# Patient Record
Sex: FEMALE | Race: BLACK OR AFRICAN AMERICAN | NOT HISPANIC OR LATINO | Employment: FULL TIME | URBAN - METROPOLITAN AREA
[De-identification: names, ages, dates, MRNs, and addresses within clinical notes are randomized per-mention and may not be internally consistent; named-entity substitution may affect disease eponyms.]

---

## 2017-11-16 ENCOUNTER — HISTORICAL (OUTPATIENT)
Dept: LAB | Facility: HOSPITAL | Age: 22
End: 2017-11-16

## 2017-11-16 LAB
ABS NEUT (OLG): 1.77 X10(3)/MCL (ref 2.1–9.2)
ALBUMIN SERPL-MCNC: 4.9 GM/DL (ref 3.4–5)
ALBUMIN/GLOB SERPL: 1.2 {RATIO}
ALP SERPL-CCNC: 84 UNIT/L (ref 38–126)
ALT SERPL-CCNC: 22 UNIT/L (ref 12–78)
AST SERPL-CCNC: 15 UNIT/L (ref 15–37)
BASOPHILS # BLD AUTO: 0 X10(3)/MCL (ref 0–0.2)
BASOPHILS NFR BLD AUTO: 1 %
BILIRUB SERPL-MCNC: 0.9 MG/DL (ref 0.2–1)
BILIRUBIN DIRECT+TOT PNL SERPL-MCNC: 0.2 MG/DL (ref 0–0.2)
BILIRUBIN DIRECT+TOT PNL SERPL-MCNC: 0.7 MG/DL (ref 0–0.8)
BUN SERPL-MCNC: 11 MG/DL (ref 7–18)
CALCIUM SERPL-MCNC: 9.9 MG/DL (ref 8.5–10.1)
CHLORIDE SERPL-SCNC: 103 MMOL/L (ref 98–107)
CHOLEST SERPL-MCNC: 181 MG/DL (ref 0–200)
CHOLEST/HDLC SERPL: 1.6 {RATIO} (ref 0–4)
CO2 SERPL-SCNC: 28 MMOL/L (ref 21–32)
CREAT SERPL-MCNC: 0.76 MG/DL (ref 0.55–1.02)
DEPRECATED CALCIDIOL+CALCIFEROL SERPL-MC: 10.31 NG/ML (ref 30–80)
EOSINOPHIL # BLD AUTO: 0.1 X10(3)/MCL (ref 0–0.9)
EOSINOPHIL NFR BLD AUTO: 2 %
ERYTHROCYTE [DISTWIDTH] IN BLOOD BY AUTOMATED COUNT: 12.9 % (ref 11.5–17)
GLOBULIN SER-MCNC: 4 GM/DL (ref 2.4–3.5)
GLUCOSE SERPL-MCNC: 73 MG/DL (ref 74–106)
HAV IGM SERPL QL IA: NEGATIVE
HBV CORE IGM SERPL QL IA: NEGATIVE
HBV SURFACE AG SERPL QL IA: NEGATIVE
HCT VFR BLD AUTO: 39.7 % (ref 37–47)
HCV AB SERPL QL IA: NEGATIVE
HDLC SERPL-MCNC: 111 MG/DL (ref 35–60)
HEPATITIS PANEL INTERP: NORMAL
HGB BLD-MCNC: 13.5 GM/DL (ref 12–16)
HIV 1+2 AB+HIV1 P24 AG SERPL QL IA: NEGATIVE
LDLC SERPL CALC-MCNC: 60 MG/DL (ref 0–129)
LYMPHOCYTES # BLD AUTO: 1.9 X10(3)/MCL (ref 0.6–4.6)
LYMPHOCYTES NFR BLD AUTO: 48 %
MCH RBC QN AUTO: 28.8 PG (ref 27–31)
MCHC RBC AUTO-ENTMCNC: 34 GM/DL (ref 33–36)
MCV RBC AUTO: 84.8 FL (ref 80–94)
MONOCYTES # BLD AUTO: 0.2 X10(3)/MCL (ref 0.1–1.3)
MONOCYTES NFR BLD AUTO: 4 %
NEUTROPHILS # BLD AUTO: 1.77 X10(3)/MCL (ref 2.1–9.2)
NEUTROPHILS NFR BLD AUTO: 44 %
PLATELET # BLD AUTO: 227 X10(3)/MCL (ref 130–400)
PLATELET # BLD EST: NORMAL 10*3/UL
PMV BLD AUTO: 11.4 FL (ref 9.4–12.4)
POTASSIUM SERPL-SCNC: 4.1 MMOL/L (ref 3.5–5.1)
PROT SERPL-MCNC: 8.9 GM/DL (ref 6.4–8.2)
RBC # BLD AUTO: 4.68 X10(6)/MCL (ref 4.2–5.4)
RPR SER QL: NORMAL
SODIUM SERPL-SCNC: 139 MMOL/L (ref 136–145)
TRIGL SERPL-MCNC: 49 MG/DL (ref 30–150)
TSH SERPL-ACNC: 0.33 MIU/ML (ref 0.36–3.74)
VLDLC SERPL CALC-MCNC: 10 MG/DL
WBC # SPEC AUTO: 4 X10(3)/MCL (ref 4.5–11.5)

## 2018-04-23 ENCOUNTER — HISTORICAL (OUTPATIENT)
Dept: URGENT CARE | Facility: CLINIC | Age: 23
End: 2018-04-23

## 2018-04-23 LAB
BILIRUB SERPL-MCNC: NEGATIVE MG/DL
BLOOD URINE, POC: NEGATIVE
CLARITY, POC UA: NORMAL
COLOR, POC UA: YELLOW
GLUCOSE UR QL STRIP: NEGATIVE
KETONES UR QL STRIP: NEGATIVE
LEUKOCYTE EST, POC UA: NORMAL
NITRITE, POC UA: POSITIVE
PH, POC UA: 6
PROTEIN, POC: NEGATIVE
SPECIFIC GRAVITY, POC UA: 1.01
UROBILINOGEN, POC UA: NORMAL

## 2018-11-16 ENCOUNTER — HISTORICAL (OUTPATIENT)
Dept: LAB | Facility: HOSPITAL | Age: 23
End: 2018-11-16

## 2018-11-16 LAB
ABS NEUT (OLG): 3.54 X10(3)/MCL (ref 2.1–9.2)
ALBUMIN SERPL-MCNC: 4.3 GM/DL (ref 3.4–5)
ALBUMIN/GLOB SERPL: 1.3 {RATIO}
ALP SERPL-CCNC: 82 UNIT/L (ref 38–126)
ALT SERPL-CCNC: 46 UNIT/L (ref 12–78)
AST SERPL-CCNC: 28 UNIT/L (ref 15–37)
BASOPHILS # BLD AUTO: 0 X10(3)/MCL (ref 0–0.2)
BASOPHILS NFR BLD AUTO: 1 %
BILIRUB SERPL-MCNC: 0.9 MG/DL (ref 0.2–1)
BILIRUBIN DIRECT+TOT PNL SERPL-MCNC: 0.2 MG/DL (ref 0–0.2)
BILIRUBIN DIRECT+TOT PNL SERPL-MCNC: 0.7 MG/DL (ref 0–0.8)
BUN SERPL-MCNC: 10 MG/DL (ref 7–18)
CALCIUM SERPL-MCNC: 9.2 MG/DL (ref 8.5–10.1)
CHLORIDE SERPL-SCNC: 107 MMOL/L (ref 98–107)
CHOLEST SERPL-MCNC: 154 MG/DL (ref 0–200)
CHOLEST/HDLC SERPL: 2.3 {RATIO} (ref 0–4)
CO2 SERPL-SCNC: 28 MMOL/L (ref 21–32)
CREAT SERPL-MCNC: 0.85 MG/DL (ref 0.55–1.02)
DEPRECATED CALCIDIOL+CALCIFEROL SERPL-MC: 15.02 NG/ML (ref 30–80)
EOSINOPHIL # BLD AUTO: 0.1 X10(3)/MCL (ref 0–0.9)
EOSINOPHIL NFR BLD AUTO: 2 %
ERYTHROCYTE [DISTWIDTH] IN BLOOD BY AUTOMATED COUNT: 12.5 % (ref 11.5–17)
GLOBULIN SER-MCNC: 3.2 GM/DL (ref 2.4–3.5)
GLUCOSE SERPL-MCNC: 81 MG/DL (ref 74–106)
HCT VFR BLD AUTO: 37.7 % (ref 37–47)
HDLC SERPL-MCNC: 68 MG/DL (ref 35–60)
HGB BLD-MCNC: 12.1 GM/DL (ref 12–16)
LDLC SERPL CALC-MCNC: 78 MG/DL (ref 0–129)
LYMPHOCYTES # BLD AUTO: 1.8 X10(3)/MCL (ref 0.6–4.6)
LYMPHOCYTES NFR BLD AUTO: 30 %
MCH RBC QN AUTO: 28.1 PG (ref 27–31)
MCHC RBC AUTO-ENTMCNC: 32.1 GM/DL (ref 33–36)
MCV RBC AUTO: 87.5 FL (ref 80–94)
MONOCYTES # BLD AUTO: 0.3 X10(3)/MCL (ref 0.1–1.3)
MONOCYTES NFR BLD AUTO: 5 %
NEUTROPHILS # BLD AUTO: 3.54 X10(3)/MCL (ref 2.1–9.2)
NEUTROPHILS NFR BLD AUTO: 61 %
PLATELET # BLD AUTO: 277 X10(3)/MCL (ref 130–400)
PMV BLD AUTO: 10.1 FL (ref 9.4–12.4)
POTASSIUM SERPL-SCNC: 3.6 MMOL/L (ref 3.5–5.1)
PROT SERPL-MCNC: 7.5 GM/DL (ref 6.4–8.2)
RBC # BLD AUTO: 4.31 X10(6)/MCL (ref 4.2–5.4)
SODIUM SERPL-SCNC: 143 MMOL/L (ref 136–145)
T3FREE SERPL-MCNC: 2.81 PG/ML (ref 2.18–3.98)
T4 FREE SERPL-MCNC: 0.94 NG/DL (ref 0.76–1.46)
TRIGL SERPL-MCNC: 39 MG/DL (ref 30–150)
TSH SERPL-ACNC: 0.2 MIU/L (ref 0.36–3.74)
VLDLC SERPL CALC-MCNC: 8 MG/DL
WBC # SPEC AUTO: 5.8 X10(3)/MCL (ref 4.5–11.5)

## 2019-04-02 LAB
BILIRUB SERPL-MCNC: NEGATIVE MG/DL
BLOOD URINE, POC: NEGATIVE
CLARITY, POC UA: CLEAR
COLOR, POC UA: YELLOW
GLUCOSE UR QL STRIP: NEGATIVE
KETONES UR QL STRIP: NEGATIVE
LEUKOCYTE EST, POC UA: NORMAL
NITRITE, POC UA: NEGATIVE
PH, POC UA: 7
PROTEIN, POC: NEGATIVE
SPECIFIC GRAVITY, POC UA: 1.02
UROBILINOGEN, POC UA: NORMAL

## 2019-04-16 ENCOUNTER — HISTORICAL (OUTPATIENT)
Dept: ADMINISTRATIVE | Facility: HOSPITAL | Age: 24
End: 2019-04-16

## 2019-06-21 PROBLEM — N62 MACROMASTIA: Status: ACTIVE | Noted: 2019-06-21

## 2019-07-12 PROBLEM — Z98.890 S/P BILATERAL BREAST REDUCTION: Status: ACTIVE | Noted: 2019-07-12

## 2019-09-03 ENCOUNTER — HISTORICAL (OUTPATIENT)
Dept: ADMINISTRATIVE | Facility: HOSPITAL | Age: 24
End: 2019-09-03

## 2019-09-03 LAB
BILIRUB SERPL-MCNC: NEGATIVE MG/DL
BLOOD URINE, POC: NEGATIVE
CLARITY, POC UA: CLEAR
COLOR, POC UA: YELLOW
GLUCOSE UR QL STRIP: NEGATIVE
HAV IGM SERPL QL IA: NONREACTIVE
HBV CORE IGM SERPL QL IA: NONREACTIVE
HBV SURFACE AG SERPL QL IA: NEGATIVE
HCV AB SERPL QL IA: NONREACTIVE
HIV 1+2 AB+HIV1 P24 AG SERPL QL IA: NONREACTIVE
KETONES UR QL STRIP: NEGATIVE
LEUKOCYTE EST, POC UA: NEGATIVE
NITRITE, POC UA: NEGATIVE
PH, POC UA: 7.5
PROTEIN, POC: NEGATIVE
SPECIFIC GRAVITY, POC UA: 1.01
T PALLIDUM AB SER QL: NONREACTIVE
UROBILINOGEN, POC UA: NORMAL

## 2020-03-12 ENCOUNTER — HISTORICAL (OUTPATIENT)
Dept: LAB | Facility: HOSPITAL | Age: 25
End: 2020-03-12

## 2020-03-12 LAB
ABS NEUT (OLG): 3.34 X10(3)/MCL (ref 2.1–9.2)
ALBUMIN SERPL-MCNC: 4.1 GM/DL (ref 3.4–5)
ALBUMIN/GLOB SERPL: 1.2 {RATIO}
ALP SERPL-CCNC: 90 UNIT/L (ref 38–126)
ALT SERPL-CCNC: 42 UNIT/L (ref 12–78)
AST SERPL-CCNC: 30 UNIT/L (ref 15–37)
BASOPHILS # BLD AUTO: 0 X10(3)/MCL (ref 0–0.2)
BASOPHILS NFR BLD AUTO: 0 %
BILIRUB SERPL-MCNC: 0.7 MG/DL (ref 0.2–1)
BILIRUBIN DIRECT+TOT PNL SERPL-MCNC: 0.2 MG/DL (ref 0–0.2)
BILIRUBIN DIRECT+TOT PNL SERPL-MCNC: 0.5 MG/DL (ref 0–0.8)
BUN SERPL-MCNC: 13 MG/DL (ref 7–18)
CALCIUM SERPL-MCNC: 9 MG/DL (ref 8.5–10.1)
CHLORIDE SERPL-SCNC: 105 MMOL/L (ref 98–107)
CHOLEST SERPL-MCNC: 155 MG/DL (ref 0–200)
CHOLEST/HDLC SERPL: 1.9 {RATIO} (ref 0–4)
CO2 SERPL-SCNC: 28 MMOL/L (ref 21–32)
CREAT SERPL-MCNC: 0.85 MG/DL (ref 0.55–1.02)
DEPRECATED CALCIDIOL+CALCIFEROL SERPL-MC: 16.56 NG/DL (ref 30–80)
EOSINOPHIL # BLD AUTO: 0.2 X10(3)/MCL (ref 0–0.9)
EOSINOPHIL NFR BLD AUTO: 3 %
ERYTHROCYTE [DISTWIDTH] IN BLOOD BY AUTOMATED COUNT: 12.5 % (ref 11.5–17)
GLOBULIN SER-MCNC: 3.4 GM/DL (ref 2.4–3.5)
GLUCOSE SERPL-MCNC: 69 MG/DL (ref 74–106)
HCT VFR BLD AUTO: 39.4 % (ref 37–47)
HDLC SERPL-MCNC: 82 MG/DL (ref 35–60)
HGB BLD-MCNC: 12.5 GM/DL (ref 12–16)
LDLC SERPL CALC-MCNC: 58 MG/DL (ref 0–129)
LYMPHOCYTES # BLD AUTO: 2.2 X10(3)/MCL (ref 0.6–4.6)
LYMPHOCYTES NFR BLD AUTO: 35 %
MCH RBC QN AUTO: 28.1 PG (ref 27–31)
MCHC RBC AUTO-ENTMCNC: 31.7 GM/DL (ref 33–36)
MCV RBC AUTO: 88.5 FL (ref 80–94)
MONOCYTES # BLD AUTO: 0.5 X10(3)/MCL (ref 0.1–1.3)
MONOCYTES NFR BLD AUTO: 8 %
NEUTROPHILS # BLD AUTO: 3.34 X10(3)/MCL (ref 2.1–9.2)
NEUTROPHILS NFR BLD AUTO: 54 %
PLATELET # BLD AUTO: 245 X10(3)/MCL (ref 130–400)
PMV BLD AUTO: 10.4 FL (ref 9.4–12.4)
POTASSIUM SERPL-SCNC: 3.8 MMOL/L (ref 3.5–5.1)
PROT SERPL-MCNC: 7.5 GM/DL (ref 6.4–8.2)
RBC # BLD AUTO: 4.45 X10(6)/MCL (ref 4.2–5.4)
SODIUM SERPL-SCNC: 138 MMOL/L (ref 136–145)
T3FREE SERPL-MCNC: 2.8 PG/ML (ref 2.18–3.98)
T4 FREE SERPL-MCNC: 0.84 NG/DL (ref 0.76–1.46)
TRIGL SERPL-MCNC: 77 MG/DL (ref 30–150)
TSH SERPL-ACNC: 0.33 MIU/L (ref 0.36–3.74)
VLDLC SERPL CALC-MCNC: 15 MG/DL
WBC # SPEC AUTO: 6.2 X10(3)/MCL (ref 4.5–11.5)

## 2020-08-17 ENCOUNTER — HISTORICAL (OUTPATIENT)
Dept: ADMINISTRATIVE | Facility: HOSPITAL | Age: 25
End: 2020-08-17

## 2020-08-17 LAB
APPEARANCE, UA: CLEAR
BACTERIA #/AREA URNS AUTO: NORMAL /HPF
BILIRUB SERPL-MCNC: NEGATIVE MG/DL
BILIRUB UR QL STRIP: NEGATIVE
BLOOD URINE, POC: NORMAL
CLARITY, POC UA: CLEAR
COLOR UR: COLORLESS
COLOR, POC UA: YELLOW
GLUCOSE (UA): NEGATIVE
GLUCOSE UR QL STRIP: NEGATIVE
HGB UR QL STRIP: NEGATIVE
HYALINE CASTS #/AREA URNS LPF: 0 /LPF
KETONES UR QL STRIP: NEGATIVE
KETONES UR QL STRIP: NEGATIVE
LEUKOCYTE EST, POC UA: NORMAL
LEUKOCYTE ESTERASE UR QL STRIP: NEGATIVE
NITRITE UR QL STRIP: NEGATIVE
NITRITE, POC UA: NEGATIVE
PH UR STRIP: 6 [PH] (ref 4.5–8)
PH, POC UA: 6
PROT UR QL STRIP: NEGATIVE
PROTEIN, POC: NEGATIVE
RBC #/AREA URNS AUTO: 0 /HPF
SP GR UR STRIP: 1 (ref 1–1.03)
SPECIFIC GRAVITY, POC UA: 1
SQUAMOUS #/AREA URNS LPF: <1 /LPF
UROBILINOGEN UR STRIP-ACNC: NORMAL
UROBILINOGEN, POC UA: NORMAL
WBC #/AREA URNS AUTO: 0 /HPF

## 2020-08-18 ENCOUNTER — HISTORICAL (OUTPATIENT)
Dept: ADMINISTRATIVE | Facility: HOSPITAL | Age: 25
End: 2020-08-18

## 2020-08-18 LAB
HAV IGM SERPL QL IA: NONREACTIVE
HBV CORE IGM SERPL QL IA: NONREACTIVE
HBV SURFACE AG SERPL QL IA: NONREACTIVE
HCV AB SERPL QL IA: NONREACTIVE
HIV 1+2 AB+HIV1 P24 AG SERPL QL IA: NONREACTIVE
T PALLIDUM AB SER QL: NONREACTIVE

## 2020-08-19 LAB — FINAL CULTURE: NO GROWTH

## 2020-09-02 ENCOUNTER — HISTORICAL (OUTPATIENT)
Dept: ADMINISTRATIVE | Facility: HOSPITAL | Age: 25
End: 2020-09-02

## 2020-09-02 LAB — STREP A PCR (OHS): NOT DETECTED

## 2021-04-12 ENCOUNTER — HISTORICAL (OUTPATIENT)
Dept: ADMINISTRATIVE | Facility: HOSPITAL | Age: 26
End: 2021-04-12

## 2021-04-15 LAB — FINAL CULTURE: NORMAL

## 2021-06-12 ENCOUNTER — HISTORICAL (OUTPATIENT)
Dept: ADMINISTRATIVE | Facility: HOSPITAL | Age: 26
End: 2021-06-12

## 2021-06-12 LAB
BILIRUB SERPL-MCNC: NEGATIVE MG/DL
BLOOD URINE, POC: NEGATIVE
CLARITY, POC UA: NORMAL
COLOR, POC UA: YELLOW
GLUCOSE UR QL STRIP: NEGATIVE
KETONES UR QL STRIP: NEGATIVE
LEUKOCYTE EST, POC UA: NORMAL
NITRITE, POC UA: NEGATIVE
PH, POC UA: 7
PROTEIN, POC: NEGATIVE
SPECIFIC GRAVITY, POC UA: 1.02
UROBILINOGEN, POC UA: NORMAL

## 2021-06-14 LAB — FINAL CULTURE: NO GROWTH

## 2021-07-29 ENCOUNTER — HISTORICAL (OUTPATIENT)
Dept: LAB | Facility: HOSPITAL | Age: 26
End: 2021-07-29

## 2021-07-29 LAB
ABS NEUT (OLG): 2.72 X10(3)/MCL (ref 2.1–9.2)
ALBUMIN SERPL-MCNC: 4.3 GM/DL (ref 3.5–5)
ALBUMIN/GLOB SERPL: 1.2 RATIO (ref 1.1–2)
ALP SERPL-CCNC: 62 UNIT/L (ref 40–150)
ALT SERPL-CCNC: 13 UNIT/L (ref 0–55)
AST SERPL-CCNC: 19 UNIT/L (ref 5–34)
BASOPHILS # BLD AUTO: 0.02 X10(3)/MCL (ref 0–0.2)
BASOPHILS NFR BLD AUTO: 0.4 % (ref 0–1)
BILIRUB SERPL-MCNC: 0.5 MG/DL (ref 0.2–1.2)
BILIRUBIN DIRECT+TOT PNL SERPL-MCNC: 0.2 MG/DL (ref 0–0.5)
BILIRUBIN DIRECT+TOT PNL SERPL-MCNC: 0.3 MG/DL (ref 0–0.8)
BUN SERPL-MCNC: 12.4 MG/DL (ref 7–18.7)
CALCIUM SERPL-MCNC: 9.9 MG/DL (ref 8.4–10.2)
CHLORIDE SERPL-SCNC: 106 MMOL/L (ref 98–107)
CHOLEST SERPL-MCNC: 151 MG/DL
CHOLEST/HDLC SERPL: 2 {RATIO} (ref 0–5)
CO2 SERPL-SCNC: 29 MMOL/L (ref 22–29)
CREAT SERPL-MCNC: 0.88 MG/DL (ref 0.57–1.11)
DEPRECATED CALCIDIOL+CALCIFEROL SERPL-MC: 30 NG/ML (ref 30–80)
EOSINOPHIL # BLD AUTO: 0.06 X10(3)/MCL (ref 0–0.9)
EOSINOPHIL NFR BLD AUTO: 1.2 % (ref 0–6.4)
ERYTHROCYTE [DISTWIDTH] IN BLOOD BY AUTOMATED COUNT: 12.2 % (ref 11.5–17)
EST. AVERAGE GLUCOSE BLD GHB EST-MCNC: <96.8 MG/DL
GLOBULIN SER-MCNC: 3.5 GM/DL (ref 2.4–3.5)
GLUCOSE SERPL-MCNC: 88 MG/DL (ref 74–100)
HBA1C MFR BLD: <5 %
HCT VFR BLD AUTO: 37.3 % (ref 37–47)
HDLC SERPL-MCNC: 64 MG/DL (ref 40–60)
HGB BLD-MCNC: 12.5 GM/DL (ref 12–16)
HUMAN PAPILLOMAVIRUS (HPV): NORMAL
IMM GRANULOCYTES # BLD AUTO: 0.01 10*3/UL (ref 0–0.02)
IMM GRANULOCYTES NFR BLD AUTO: 0.2 % (ref 0–0.43)
LDLC SERPL CALC-MCNC: 67 MG/DL (ref 50–140)
LYMPHOCYTES # BLD AUTO: 1.99 X10(3)/MCL (ref 0.6–4.6)
LYMPHOCYTES NFR BLD AUTO: 38.4 % (ref 16–44)
MCH RBC QN AUTO: 29.7 PG (ref 27–31)
MCHC RBC AUTO-ENTMCNC: 33.5 GM/DL (ref 33–36)
MCV RBC AUTO: 88.6 FL (ref 80–94)
MONOCYTES # BLD AUTO: 0.38 X10(3)/MCL (ref 0.1–1.3)
MONOCYTES NFR BLD AUTO: 7.3 % (ref 4–12.1)
NEUTROPHILS # BLD AUTO: 2.72 X10(3)/MCL (ref 2.1–9.2)
NEUTROPHILS NFR BLD AUTO: 52.5 % (ref 43–73)
NRBC BLD AUTO-RTO: 0 % (ref 0–0.2)
PAP RECOMMENDATION EXT: NORMAL
PAP SMEAR: NORMAL
PLATELET # BLD AUTO: 243 X10(3)/MCL (ref 130–400)
PMV BLD AUTO: 10.3 FL (ref 7.4–10.4)
POTASSIUM SERPL-SCNC: 3.8 MMOL/L (ref 3.5–5.1)
PROT SERPL-MCNC: 7.8 GM/DL (ref 6.4–8.3)
RBC # BLD AUTO: 4.21 X10(6)/MCL (ref 4.2–5.4)
SODIUM SERPL-SCNC: 144 MMOL/L (ref 136–145)
T3FREE SERPL-MCNC: 2.84 PG/ML (ref 1.58–3.91)
T4 FREE SERPL-MCNC: 0.77 NG/DL (ref 0.7–1.48)
TRIGL SERPL-MCNC: 99 MG/DL (ref 0–150)
TSH SERPL-ACNC: 0.41 UIU/ML (ref 0.35–4.94)
VLDLC SERPL CALC-MCNC: 20 MG/DL
WBC # SPEC AUTO: 5.2 X10(3)/MCL (ref 4.5–11.5)

## 2021-09-28 LAB
BILIRUB SERPL-MCNC: NEGATIVE MG/DL
BLOOD URINE, POC: NEGATIVE
CLARITY, POC UA: CLEAR
COLOR, POC UA: YELLOW
GLUCOSE UR QL STRIP: NEGATIVE
KETONES UR QL STRIP: NEGATIVE
LEUKOCYTE EST, POC UA: NORMAL
NITRITE, POC UA: NEGATIVE
PH, POC UA: 7.5
PROTEIN, POC: NEGATIVE
SPECIFIC GRAVITY, POC UA: 1.02
UROBILINOGEN, POC UA: NORMAL

## 2021-11-08 LAB
BILIRUB SERPL-MCNC: NEGATIVE MG/DL
BLOOD URINE, POC: NEGATIVE
CLARITY, POC UA: NORMAL
COLOR, POC UA: YELLOW
GLUCOSE UR QL STRIP: NEGATIVE
KETONES UR QL STRIP: NORMAL
LEUKOCYTE EST, POC UA: NORMAL
NITRITE, POC UA: NEGATIVE
PH, POC UA: 6
PROTEIN, POC: NEGATIVE
SPECIFIC GRAVITY, POC UA: 1.02
UROBILINOGEN, POC UA: NORMAL

## 2022-01-08 LAB
BILIRUB SERPL-MCNC: NEGATIVE MG/DL
BLOOD URINE, POC: NEGATIVE
CLARITY, POC UA: CLEAR
COLOR, POC UA: YELLOW
GLUCOSE UR QL STRIP: NEGATIVE
KETONES UR QL STRIP: NEGATIVE
LEUKOCYTE EST, POC UA: NORMAL
NITRITE, POC UA: NEGATIVE
PH, POC UA: 6
PROTEIN, POC: NEGATIVE
SPECIFIC GRAVITY, POC UA: 1.02
UROBILINOGEN, POC UA: NORMAL

## 2022-04-10 ENCOUNTER — HISTORICAL (OUTPATIENT)
Dept: ADMINISTRATIVE | Facility: HOSPITAL | Age: 27
End: 2022-04-10
Payer: COMMERCIAL

## 2022-04-11 ENCOUNTER — HISTORICAL (OUTPATIENT)
Dept: ADMINISTRATIVE | Facility: HOSPITAL | Age: 27
End: 2022-04-11
Payer: COMMERCIAL

## 2022-04-28 VITALS
BODY MASS INDEX: 36.75 KG/M2 | DIASTOLIC BLOOD PRESSURE: 77 MMHG | OXYGEN SATURATION: 100 % | WEIGHT: 234.13 LBS | HEIGHT: 67 IN | SYSTOLIC BLOOD PRESSURE: 117 MMHG

## 2022-04-28 VITALS
BODY MASS INDEX: 36.75 KG/M2 | SYSTOLIC BLOOD PRESSURE: 117 MMHG | DIASTOLIC BLOOD PRESSURE: 77 MMHG | OXYGEN SATURATION: 100 % | WEIGHT: 234.13 LBS | HEIGHT: 67 IN

## 2022-05-03 NOTE — HISTORICAL [facility] CERNER
This is a historical note converted from [employer]. Formatting and pictures may have been removed.  Please reference [employer] for original formatting and attached multimedia. Chief Complaint  possible UTI x 2 days  History of Present Illness  Pt is a 24-year-old female, here today for burning on urination x2 days. Denies abd pain, vaginal discharge, N/V/D,?flank pain?fever, body aches.? States she does have a new sexual partner,?requesting gonorrhea and chlamydia screening, declined?wet prep and?full STD panel today. Currently taking no medication for symptoms.  Review of Systems  Constitutional: negative except as stated in HPI  Eye: negative except as stated in HPI  ENT: negative except as stated in HPI  Respiratory: negative except as stated in HPI  Cardiovascular: negative except as stated in HPI  Gastrointestinal: negative except as stated in HPI  Genitourinary: negative except as stated in HPI  Hema/Lymph: negative except as stated in HPI  Endocrine: negative except as stated in HPI  Immunologic: negative except as stated in HPI  Musculoskeletal: negative except as stated in HPI  Integumentary: negative except as stated in HPI  Neurologic: negative except as stated in HPI  All Other ROS_ negative except as stated in HPI  ?  ?  Physical Exam  Vitals & Measurements  T:?36.9? ?C (Oral)? HR:?74(Peripheral)? BP:?123/83? SpO2:?98%?  HT:?170.00?cm? WT:?103.700?kg? BMI:?35.88? LMP:?08/08/2020 00:00 CDT?  General: Alert and oriented, No acute distress.  Eye: Pupils are equal, round and reactive to light, Extraocular movements are intact.  HENT: Normocephalic.  Respiratory: Lungs are clear to auscultation, Respirations are non-labored, Breath sounds are equal, Symmetrical chest wall expansion.  Cardiovascular: Normal rate, Regular rhythm, No murmur.  Gastrointestinal: Soft, Non-tender, Non-distended, Normal bowel sounds.  Genitourinary: No?cva tenderness  Musculoskeletal: Normal range of motion.  Integumentary: Warm, Dry,  Intact.  Neurologic: No focal deficits, Cranial Nerves II-XII are grossly intact.  ?  Assessment/Plan  1.?Acute UTI?N39.0  ?UA + leuk, - nit, + blood. Medication as ordered. Maintain adequate hydration. Wipe front to back.? Urine sent for C&S. ?ER precautions  Ordered:  nitrofurantoin, 100 mg = 1 cap(s), Oral, BID, X 7 day(s), # 14 cap(s), 0 Refill(s), Pharmacy: [facility] #[id number], 170, cm, Height/Length Dosing, 08/17/20 11:08:00 CDT, 103.7, kg, Weight Dosing, 08/17/20 11:08:00 CDT  Office/Outpatient Visit Level 4 Established 60882 PC, Acute UTI  Dysuria  Obesity, 08/17/20 11:30:00 CDT  Urinalysis with Microscopic if Indicated, Stat collect, Urine, 08/17/20 11:12:00 CDT, Stop date 08/17/20 11:12:00 CDT, Nurse collect, Acute UTI  Dysuria  Urine Culture [id number], Stat collect, 08/17/20 11:12:00 CDT, Urine, Nurse collect, Stop date 08/17/20 11:12:00 CDT, Acute UTI  Dysuria  ?  2.?Dysuria?R30.0  ?poc as above.  Ordered:  phenazopyridine, 200 mg = 1 tab(s), Oral, TID, X 3 day(s), # 9 tab(s), 0 Refill(s), Pharmacy: [facility] #[id number], 170, cm, Height/Length Dosing, 08/17/20 11:08:00 CDT, 103.7, kg, Weight Dosing, 08/17/20 11:08:00 CDT  Chlamydia trach and N. gonorrhea PCR, Stat collect, Urine, 08/17/20 11:21:00 CDT, Stop date 08/17/20 11:22:00 CDT, Nurse collect, Dysuria  Office/Outpatient Visit Level 4 Established 46856 PC, Acute UTI  Dysuria  Obesity, 08/17/20 11:30:00 CDT  Urinalysis with Microscopic if Indicated, Stat collect, Urine, 08/17/20 11:12:00 CDT, Stop date 08/17/20 11:12:00 CDT, Nurse collect, Acute UTI  Dysuria  Urine Culture [id number], Stat collect, 08/17/20 11:12:00 CDT, Urine, Nurse collect, Stop date 08/17/20 11:12:00 CDT, Acute UTI  Dysuria  ?  3.?Obesity?E66.9  ?Healthy BMI handouts given at discharge.???  Ordered:  Office/Outpatient Visit Level 4 Established 52808 PC, Acute UTI  Dysuria  Obesity, 08/17/20 11:30:00 CDT  ?   Problem List/Past Medical  History  Ongoing  Obesity  Vaginal discharge  Historical  No qualifying data  Procedure/Surgical History  Breast reduction sterile surgery belt ()    tubal ligation   Medications  Diflucan 150 mg oral tablet, 150 mg= 1 tab(s), Oral, Daily, 1 refills,? ?Not taking  ergocalciferol 50,000 intl units (1.25 mg) oral capsule, 29868 IntUnit= 1 cap(s), Oral, qWeek,? ?Not Taking, Completed Rx  Macrobid 100 mg oral capsule, 100 mg= 1 cap(s), Oral, BID  metronidazole 500 mg oral tablet, 500 mg= 1 tab(s), Oral, TID,? 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 Uses condoms: No., 2017  Sexually active: Yes., 2017  Substance Use  Past, 2020  Tobacco  Never (less than 100 in lifetime), N/A, 2020  Family History  Family history is negative  Immunizations  Vaccine Date Status Comments   tetanus/diphth/pertuss (Tdap) adult/adol 2019 Recorded [facility]   Health Maintenance  Health Maintenance  ???Pending?(in the next year)  ??? ??Due?  ??? ? ? ?Influenza Vaccine due??20??and every?  ??? ??Due In Future?  ??? ? ? ?Obesity Screening not due until??21??and every 1??year(s)  ??? ? ? ?Alcohol Misuse Screening not due until??21??and every 1??year(s)  ??? ? ? ?ADL Screening not due until??21??and every 1??year(s)  ??? ? ? ?Depression Screening not due until??21??and every 1??year(s)  ???Satisfied?(in the past 1 year)  ??? ??Satisfied?  ??? ? ? ?ADL Screening on??20.??Satisfied by Luanne Keslser  Klaudia  ??? ? ? ?Alcohol Misuse Screening on??03/12/20.??Satisfied by Luanne Kessler  ??? ? ? ?Blood Pressure Screening on??08/17/20.??Satisfied by Malcolm Barrett LPN  ??? ? ? ?Body Mass Index Check on??08/17/20.??Satisfied by Malcolm Barrett LPN  ??? ? ? ?Cervical Cancer Screening on??03/12/20.??Satisfied by Sharron Vivar  ??? ? ? ?Depression Screening on??06/25/20.??Satisfied by Noman Benavides  ??? ? ? ?Diabetes Screening on??03/12/20.??Satisfied by Julián Briseno.  ??? ? ? ?Obesity Screening on??08/17/20.??Satisfied by Malcolm Barrett LPN  ??? ? ? ?Tetanus Vaccine on??09/28/19.??Satisfied by Delores JUNIOR, Padmini REYNOLDS  ?

## 2022-05-03 NOTE — HISTORICAL [facility] CERNER
This is a historical note converted from [employer]. Formatting and pictures may have been removed.  Please reference [employer] for original formatting and attached multimedia. Chief Complaint  white yeast from vagina x 3 days no burning with urination or itching  History of Present Illness  Patient presents to Urgent Care for stated complaint during Covid health crisis.  25-year-old female present?to the clinic?with her 2 children?reporting with vaginal discharge for few days.? She tried OTC for yeast infection?but they are not helping?actually is getting worse.? Patient stated the discharge?is a yeast infection?it does not have a smell or order?I had BV in the past?and it smelled?denies any STD,?no urinary frequency?or dysuria.? Denies any abdominal pain or nausea vomiting or diarrhea.? Denies any recent fall or trauma, denies any fever.? Patient denies being pregnant.  Review of Systems  Constitutional: negative except as stated in HPI  Eye: negative except as stated in HPI  ENT: negative except as stated in HPI  Respiratory: negative except as stated in HPI  Cardiovascular: negative except as stated in HPI  Gastrointestinal: negative except as stated in HPI  Genitourinary: negative except as stated in HPI  Musculoskeletal: negative except as stated in HPI  Integumentary: negative except as stated in HPI  Neurologic: negative except as stated in HPI  Physical Exam  Vitals & Measurements  T:?36.8? ?C (Oral)? HR:?80(Peripheral)? BP:?124/80? SpO2:?97%?  HT:?170.00?cm? WT:?104.000?kg? BMI:?35.99? LMP:?05/15/2021 00:00 CDT?  General: well-developed well-nourished in no acute distress  Eye: PERRL, EOMI, clear conjunctiva, eyelids normal  Neck: full range of motion, no cervical lymphadenopathy  Respiratory: clear to auscultation bilaterally  Cardiovascular: regular rate and rhythm without murmurs  Gastrointestinal: soft, non-tender?with deep palpation, non-distended  :?Deferred,?if is not better in 3 days?or come back for an  exam  Musculoskeletal: BEDOYA, ambulatory,?no CVA tenderness  Integumentary: no rashes or skin lesions visible  Neurologic: cranial nerves grossly intact, no signs of peripheral neurological deficit  Assessment/Plan  1.?Vaginal discharge?N89.8  ?Discussed his exam finding with patient, urine culture?sent to lab?due to small leukocytes present in the urine,?patient declined?vaginal?wet prep?will come back in 3 days if there is no improvement?with medication prescribed.  Instructed patient notify her PCP regarding her visit today and schedule follow-up appointment in 2 to 3 days  Instructed patient to come back over emergency department if symptoms worsens or no improvement or as needed  Questions elicited and answered  Patient verbalized understanding of?discharge instructions,?verbalized understanding of medication?use,?will read education materials  Ordered:  Urine Culture [id number], Routine collect, 21 14:15:00 CDT, Urine, Nurse collect, Stop date 21 14:15:00 CDT, Vaginal discharge  ?   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 Uses condoms: No., 2017  Sexually active: Yes., 2017  Substance Use  Past, 2020  Tobacco  Never (less than 100 in lifetime), No, 2021  Family History  Family history is negative  Immunizations  Vaccine Date Status Comments   tetanus/diphth/pertuss  (Tdap) adult/adol 09/28/2019 Recorded [facility]   Health Maintenance  Health Maintenance  ???Pending?(in the next year)  ??? ??OverDue  ??? ? ? ?Influenza Vaccine due??10/01/20??and every 1??day(s)  ??? ? ? ?Alcohol Misuse Screening due??01/02/21??and every 1??year(s)  ??? ? ? ?ADL Screening due??03/12/21??and every 1??year(s)  ??? ??Due In Future?  ??? ? ? ?Depression Screening not due until??06/25/21??and every 1??year(s)  ??? ? ? ?Obesity Screening not due until??01/01/22??and every 1??year(s)  ???Satisfied?(in the past 1 year)  ??? ??Satisfied?  ??? ? ? ?Blood Pressure Screening on??06/12/21.??Satisfied by Malcolm Barrett LPN  ??? ? ? ?Body Mass Index Check on??06/12/21.??Satisfied by Malcolm Barrett LPN  ??? ? ? ?Depression Screening on??06/25/20.??Satisfied by Noman Benavides  ??? ? ? ?Obesity Screening on??06/12/21.??Satisfied by Malcolm Barrett LPN  ?  Lab Results  Urinalysis????  Color Ur (Ref range=Yellow)????Yellow  Appear Ur (Ref range=Clear)????Cloudy  pH Ur (Ref range=5-9)????7  Spec Grav Ur (Ref range=1.000-1.020)????1.020  Blood Ur (Ref range=Negaive)????Negative  Glucose Ur (Ref range=Negative)????Negative  Ketones Ur (Ref range=Negative)????Negative  Protein Ur (Ref range=Negative)????Negative  Bilirubin Ur (Ref range=Negative)????Negative  Urobilinogen (Ref range=0.1-2 BU/)????0.2 mg/dl  Leukocytes Ur (Ref range=Negative)????Small  Nitrite Ur (Ref range=Negative)????Negative  ?  Urine culture sensitivity pending?we will treat accordingly

## 2022-05-03 NOTE — HISTORICAL [facility] CERNER
This is a historical note converted from [employer]. Formatting and pictures may have been removed.  Please reference Carissa for original formatting and attached multimedia. Chief Complaint  Wants a wet prep and STD screening  History of Present Illness  25 yo presents with c/o vaginal d/c, odor and irritation.? Seen last night for UTI sx.? Put on Macrobid. She refused all STD testing last night but she remembered she had unprotected sex with a new partner x 3 days ago.? She began with odor and d/c this morning. Chlamydia and gonorrhea testing negative from last night.  Review of Systems  General: negative except as stated in hpi  Skin: negative except as stated in hpi  HEENT: negative except as stated in hpi  Respiratory: negative except as stated in hpi  CV: negative except as stated in hpi  GI: negative except as stated in hpi  : negative except as stated in hpi  MS: negative except as stated in hpi  Neuro: negative except as stated in hpi  Hematologic: negative except as stated in hpi  Endocrine: negative except as stated in hpi  Psych: negative except as stated in hpi  All other systems reviewed as negative  Physical Exam  Vitals & Measurements  T:?36.9? ?C (Oral)? HR:?73(Peripheral)? RR:?20? BP:?116/77? SpO2:?99%?  HT:?171.00?cm? WT:?104.500?kg? BMI:?35.74? LMP:?08/12/2020 00:00 CDT?  General:?well-developed, NADN  Eye: PERRLA, EOMI, conjunctiva without erythema/edema, eyelids normal  HENT:?normocephalic, atraumatic; External canals clear bilat without erythema or drainage, hearing intact;?TMs gray with good cone of light bilaterally; oropharynx without erythema/exudate; oropharynx and nasal mucosal surfaces moist; no maxillary/frontal sinus tenderness to palpation, tongue symmetrical, uvula midline  Neck: full range of motion, no thyromegaly or cervical lymphadenopathy  Respiratory:?CTAB; non-labored; no wheezing, rales or rhonchi  Cardiovascular:?RRR without murmurs, rubs or gallops; no peripheral edema; +S1,  S2; peripheral pulses 2+ bilaterally  Gastrointestinal:?soft, non-tender, non-distended with normal bowel sounds; without masses to palpation; no organomegaly  Genitourinary: no CVA tenderness to palpation  Genitalia: full amount and even pubic hair distribution; no visible?external genitalia?lesions;?no vaginal discharge present; no inflammation/erythema externally; vagina pink without erythema to labia; cervix with?white drainage, no erythema; bimanual exam without mass or tenderness  Musculoskeletal:?full range of motion of all extremities/spine without limitation or discomfort; no spinal deformity or TTP  Integumentary: no rashes or skin lesions present; dry and intact; pink and warm  Neurologic: CN I-XII intact; no signs of peripheral neurological deficit; motor/sensory function intact; no gross abnormalities  Assessment/Plan  1.?Vaginal irritation?N89.8  MetroVag gel insert vaginally once daily at bedtime x 5 days, continue Macrobid as previously prescribed yesterday  HIV, Syphilis and Hepatitis pending.  Wet prep pending, will notify of results in AM.  ER precautions for worsening?  Ordered:  Office/Outpatient Visit Level 4 Established [id number] PC, Vaginal irritation  Bacterial vaginosis, 08/18/20 20:45:00 CDT  ?  Orders:  metroNIDAZOLE topical, 1 heath, VAG, Once a day (at bedtime), X 5 day(s), # 70 gm, 0 Refill(s), Pharmacy: [facility]/pharmacy #[id number], 171, cm, Height/Length Dosing, 08/18/20 20:19:00 CDT, 104.5, kg, Weight Dosing, 08/18/20 20:19:00 CDT  Hepatitis Panel [facility], Stat collect, 08/18/20 20:39:00 CDT, Blood, Collected, Stop date 08/18/20 20:39:00 CDT, Nurse collect, Bacterial vaginosis, 08/18/20 20:39:00 CDT  HIV 1 and 2, Stat collect, 08/18/20 20:39:00 CDT, Blood, Collected, Stop date 08/18/20 20:39:00 CDT, Nurse collect, Bacterial vaginosis, 08/18/20 20:39:00 CDT  Syphilis Antibody (with Reflex RPR), Stat collect, 08/18/20 20:39:00 CDT, Blood, Collected, Stop date 08/18/20 20:39:00 CDT, Nurse collect,  Bacterial vaginosis, 20 20:39:00 CDT  Wet Prep Smear, Stat collect, Vaginal, Collected, 20 20:39:00 CDT, Stop date 20 20:39:00 CDT, Nurse collect, Bacterial vaginosis, 20 20:39:00 CDT   Problem List/Past Medical History  Ongoing  Obesity  Vaginal discharge  Historical  No qualifying data  Procedure/Surgical History  Breast reduction sterile surgery belt ()    tubal ligation   Medications  Diflucan 150 mg oral tablet, 150 mg= 1 tab(s), Oral, Daily, 1 refills,? ?Not taking  ergocalciferol 50,000 intl units (1.25 mg) oral capsule, 49927 IntUnit= 1 cap(s), Oral, qWeek,? ?Not Taking, Completed Rx  Macrobid 100 mg oral capsule, 100 mg= 1 cap(s), Oral, BID  metroNIDAZOLE 0.75% vaginal gel with applicator, 1 heath, VAG, Once a day (at bedtime)  metronidazole 500 mg oral tablet, 500 mg= 1 tab(s), Oral, TID,? 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 Uses condoms: No., 2017  Sexually active: Yes., 2017  Substance Use  Past, 2020  Tobacco  Never (less than 100 in lifetime), N/A, 2020  Family History  Family history is negative  Immunizations  Vaccine Date Status Comments   tetanus/diphth/pertuss (Tdap) adult/adol 2019 Recorded [facility]   Health Maintenance  Health Maintenance  ???Pending?(in the next year)  ??? ??Due?  ??? ? ? ?Influenza Vaccine due??20??and every?  ??? ??Due In Future?  ??? ? ? ?Obesity Screening not due until??21??and every ??year(s)  ??? ? ? ?Alcohol Misuse Screening not due  until??01/02/21??and every 1??year(s)  ??? ? ? ?ADL Screening not due until??03/12/21??and every 1??year(s)  ??? ? ? ?Depression Screening not due until??06/25/21??and every 1??year(s)  ???Satisfied?(in the past 1 year)  ??? ??Satisfied?  ??? ? ? ?ADL Screening on??03/12/20.??Satisfied by Luanne Kessler  ??? ? ? ?Alcohol Misuse Screening on??03/12/20.??Satisfied by Luanne Kessler Klaudia  ??? ? ? ?Blood Pressure Screening on??08/18/20.??Satisfied by Afsaneh Jarvis LPN.  ??? ? ? ?Body Mass Index Check on??08/18/20.??Satisfied by Chelo Jarvis LPNe YUMIKO.  ??? ? ? ?Cervical Cancer Screening on??03/12/20.??Satisfied by Sharron Vivar  ??? ? ? ?Depression Screening on??06/25/20.??Satisfied by Noman Benavides  ??? ? ? ?Diabetes Screening on??03/12/20.??Satisfied by Julián Briseno  ??? ? ? ?Obesity Screening on??08/18/20.??Satisfied by Chelo Jarvis LPNe YUMIKO.  ??? ? ? ?Tetanus Vaccine on??09/28/19.??Satisfied by Delores JUNIRO, Padmini REYNOLDS  ?  Lab Results  Test Name Test Result Date/Time   UA Appear Clear 08/17/2020 11:12 CDT   UA Color COLORLESS 08/17/2020 11:12 CDT   UA Spec Grav 1.000 (Low) 08/17/2020 11:12 CDT   UA Bili Negative 08/17/2020 11:12 CDT   UA Urobilinogen Normal 08/17/2020 11:12 CDT   UA Blood Negative 08/17/2020 11:12 CDT   UA Glucose Negative 08/17/2020 11:12 CDT   UA Ketones Negative 08/17/2020 11:12 CDT   UA Protein Negative 08/17/2020 11:12 CDT   UA Nitrite Negative 08/17/2020 11:12 CDT   UA Leuk Est Negative 08/17/2020 11:12 CDT   UA WBC Interp 0 08/17/2020 11:12 CDT   UA RBC Interp 0 08/17/2020 11:12 CDT   UA Bact Interp None Seen 08/17/2020 11:12 CDT   Chlam trach PCR NOT DETECTED. 08/17/2020 11:22 CDT   N. gonorrhea PCR NOT DETECTED. 08/17/2020 11:22 CDT       Wet prep indicates moderate clue cells and trich, pt called and told to stop Macrobid and Intravaginal Flagyl. RX for Flagyl 500mg po BID x 7 days sent to [facility] on [address].

## 2022-06-05 ENCOUNTER — OFFICE VISIT (OUTPATIENT)
Dept: URGENT CARE | Facility: CLINIC | Age: 27
End: 2022-06-05
Payer: COMMERCIAL

## 2022-06-05 VITALS
RESPIRATION RATE: 16 BRPM | TEMPERATURE: 98 F | WEIGHT: 232.56 LBS | BODY MASS INDEX: 35.25 KG/M2 | DIASTOLIC BLOOD PRESSURE: 77 MMHG | HEART RATE: 75 BPM | OXYGEN SATURATION: 100 % | HEIGHT: 68 IN | SYSTOLIC BLOOD PRESSURE: 110 MMHG

## 2022-06-05 DIAGNOSIS — R68.89 FLU-LIKE SYMPTOMS: Primary | ICD-10-CM

## 2022-06-05 DIAGNOSIS — J06.9 ACUTE URI: ICD-10-CM

## 2022-06-05 LAB
FLUAV AG UPPER RESP QL IA.RAPID: NOT DETECTED
FLUAV AG UPPER RESP QL IA.RAPID: NOT DETECTED
FLUBV AG UPPER RESP QL IA.RAPID: NOT DETECTED
FLUBV AG UPPER RESP QL IA.RAPID: NOT DETECTED
RSV A 5' UTR RNA NPH QL NAA+PROBE: NOT DETECTED
SARS-COV-2 RNA RESP QL NAA+PROBE: NOT DETECTED
SARS-COV-2 RNA RESP QL NAA+PROBE: NOT DETECTED
STREP A PCR (OHS): NOT DETECTED

## 2022-06-05 PROCEDURE — 87636 SARSCOV2 & INF A&B AMP PRB: CPT | Performed by: NURSE PRACTITIONER

## 2022-06-05 PROCEDURE — 99213 PR OFFICE/OUTPT VISIT, EST, LEVL III, 20-29 MIN: ICD-10-PCS | Mod: S$PBB,,, | Performed by: NURSE PRACTITIONER

## 2022-06-05 PROCEDURE — 99213 OFFICE O/P EST LOW 20 MIN: CPT | Mod: S$PBB,,, | Performed by: NURSE PRACTITIONER

## 2022-06-05 PROCEDURE — 87631 RESP VIRUS 3-5 TARGETS: CPT | Performed by: NURSE PRACTITIONER

## 2022-06-05 RX ORDER — MULTIVITAMIN
1 TABLET ORAL DAILY
COMMUNITY

## 2022-06-05 RX ORDER — BENZONATATE 200 MG/1
200 CAPSULE ORAL 3 TIMES DAILY PRN
Qty: 30 CAPSULE | Refills: 0 | Status: SHIPPED | OUTPATIENT
Start: 2022-06-05 | End: 2022-06-15

## 2022-06-05 RX ORDER — PROMETHAZINE HYDROCHLORIDE AND DEXTROMETHORPHAN HYDROBROMIDE 6.25; 15 MG/5ML; MG/5ML
5 SYRUP ORAL EVERY 6 HOURS PRN
Qty: 100 ML | Refills: 0 | Status: SHIPPED | OUTPATIENT
Start: 2022-06-05 | End: 2022-06-10

## 2022-06-05 NOTE — PATIENT INSTRUCTIONS
- OTC cold/flu products as desired for symptoms  - Plenty of fluids  - Home from work/school  - Tylenol or Motrin for pain/fever  - Flu/COVID/Strep tests pending

## 2022-06-05 NOTE — PROGRESS NOTES
"Subjective:       Patient ID: Izabel Maldonado is a 26 y.o. female.    Vitals:  height is 5' 7.72" (1.72 m) and weight is 105.5 kg (232 lb 9.4 oz). Her temperature is 98.2 °F (36.8 °C). Her blood pressure is 110/77 and her pulse is 75. Her respiration is 16 and oxygen saturation is 100%.     Chief Complaint: Sore Throat, Ear Fullness, and Headache    Onset yesterday. Had a patient this past week with RSV.    ROS    Objective:      Physical Exam   Constitutional: She is oriented to person, place, and time.  Non-toxic appearance. She appears ill. No distress.   HENT:   Mouth/Throat: Mucous membranes are moist. No posterior oropharyngeal erythema. Oropharynx is clear.   Eyes: Conjunctivae are normal. Pupils are equal, round, and reactive to light.   Cardiovascular: Normal rate, regular rhythm and normal heart sounds.   Pulmonary/Chest: Effort normal and breath sounds normal.   Lymphadenopathy:     She has no cervical adenopathy.   Neurological: She is alert and oriented to person, place, and time.   Skin: Skin is warm and dry.   Psychiatric: Her behavior is normal. Mood, judgment and thought content normal.         Assessment:       1. Flu-like symptoms    2. Acute URI          Plan:         Flu-like symptoms  -     COVID/FLU A&B PCR; Future; Expected date: 06/05/2022  -     Strep Group A by PCR; Future; Expected date: 06/05/2022    Acute URI         - OTC cold/flu products as desired for symptoms  - Plenty of fluids  - Home from work/school  - Tylenol or Motrin for pain/fever  - Flu/COVID/Strep tests pending           "

## 2022-06-05 NOTE — LETTER
June 5, 2022      [facility]  [address] [address]  Phone: [phone]       Patient: Izabel Maldonado   YOB: 1995  Date of Visit: 06/05/2022    To Whom It May Concern:    Ana Maldonado  was at [facility] on 06/05/2022. The patient may return to work/school on 6/7/22 without restrictions. If you have any questions or concerns, or if I can be of further assistance, please do not hesitate to contact me.    Sincerely,    MARICHUY Foster NP

## 2022-06-13 ENCOUNTER — TELEPHONE (OUTPATIENT)
Dept: URGENT CARE | Facility: CLINIC | Age: 27
End: 2022-06-13
Payer: MEDICAID

## 2022-06-13 NOTE — TELEPHONE ENCOUNTER
----- Message from MELVA Enriquez sent at 6/13/2022 12:23 PM CDT -----  Please ensure the patient is aware that her tests for covid/flu/rsv were all negative.  Not seen in portal.

## 2022-08-04 ENCOUNTER — DOCUMENTATION ONLY (OUTPATIENT)
Dept: ADMINISTRATIVE | Facility: HOSPITAL | Age: 27
End: 2022-08-04
Payer: MEDICAID

## 2022-09-21 ENCOUNTER — HISTORICAL (OUTPATIENT)
Dept: ADMINISTRATIVE | Facility: HOSPITAL | Age: 27
End: 2022-09-21
Payer: MEDICAID

## 2023-03-10 ENCOUNTER — OFFICE VISIT (OUTPATIENT)
Dept: URGENT CARE | Facility: CLINIC | Age: 28
End: 2023-03-10
Payer: COMMERCIAL

## 2023-03-10 VITALS
WEIGHT: 241.38 LBS | TEMPERATURE: 98 F | HEART RATE: 81 BPM | OXYGEN SATURATION: 100 % | BODY MASS INDEX: 37.89 KG/M2 | RESPIRATION RATE: 18 BRPM | HEIGHT: 67 IN | SYSTOLIC BLOOD PRESSURE: 119 MMHG | DIASTOLIC BLOOD PRESSURE: 81 MMHG

## 2023-03-10 DIAGNOSIS — J30.9 ALLERGIC RHINITIS, UNSPECIFIED SEASONALITY, UNSPECIFIED TRIGGER: Primary | ICD-10-CM

## 2023-03-10 DIAGNOSIS — R68.89 FLU-LIKE SYMPTOMS: ICD-10-CM

## 2023-03-10 DIAGNOSIS — Z11.52 ENCOUNTER FOR SCREENING FOR COVID-19: ICD-10-CM

## 2023-03-10 DIAGNOSIS — J02.9 SORE THROAT: ICD-10-CM

## 2023-03-10 LAB
CTP QC/QA: YES
FLUAV AG NPH QL: NEGATIVE
FLUBV AG NPH QL: NEGATIVE
MOLECULAR STREP A: NEGATIVE
SARS-COV-2 RDRP RESP QL NAA+PROBE: NEGATIVE

## 2023-03-10 PROCEDURE — 99214 OFFICE O/P EST MOD 30 MIN: CPT | Mod: PBBFAC

## 2023-03-10 PROCEDURE — 99213 OFFICE O/P EST LOW 20 MIN: CPT | Mod: S$PBB,,,

## 2023-03-10 PROCEDURE — 87651 STREP A DNA AMP PROBE: CPT | Mod: PBBFAC

## 2023-03-10 PROCEDURE — 87635 SARS-COV-2 COVID-19 AMP PRB: CPT | Mod: PBBFAC

## 2023-03-10 PROCEDURE — 99213 PR OFFICE/OUTPT VISIT, EST, LEVL III, 20-29 MIN: ICD-10-PCS | Mod: S$PBB,,,

## 2023-03-10 PROCEDURE — 87804 INFLUENZA ASSAY W/OPTIC: CPT | Mod: 59,PBBFAC

## 2023-03-10 RX ORDER — CETIRIZINE HYDROCHLORIDE 10 MG/1
10 TABLET ORAL DAILY
Qty: 30 TABLET | Refills: 11 | Status: SHIPPED | OUTPATIENT
Start: 2023-03-10 | End: 2024-03-09

## 2023-03-10 RX ORDER — DEXAMETHASONE SODIUM PHOSPHATE 100 MG/10ML
8 INJECTION INTRAMUSCULAR; INTRAVENOUS
Status: COMPLETED | OUTPATIENT
Start: 2023-03-10 | End: 2023-03-10

## 2023-03-10 RX ADMIN — DEXAMETHASONE SODIUM PHOSPHATE 8 MG: 100 INJECTION INTRAMUSCULAR; INTRAVENOUS at 05:03

## 2023-03-10 NOTE — PROGRESS NOTES
"Subjective:       Patient ID: Izabel Maldonado is a 27 y.o. female.    Vitals:  height is 5' 7.32" (1.71 m) and weight is 109.5 kg (241 lb 6.4 oz). Her temperature is 98.2 °F (36.8 °C). Her blood pressure is 119/81 and her pulse is 81. Her respiration is 18 and oxygen saturation is 100%.     Chief Complaint: Sore Throat (Sore throat, runny nose, congestion today.)    PT states sore throat, runny nose and congestion for a week. States slight improvement with zyrtec. Denies known sick contacts.    Sore Throat   Associated symptoms include congestion.     Constitution: Negative.   HENT:  Positive for congestion and sore throat.    Neck: neck negative.   Cardiovascular: Negative.    Eyes: Negative.    Respiratory: Negative.     Gastrointestinal: Negative.    Genitourinary: Negative.    Musculoskeletal: Negative.    Skin: Negative.    Neurological: Negative.      Objective:      Physical Exam   Constitutional: She is oriented to person, place, and time.   HENT:   Head: Normocephalic.   Ears:   Right Ear: Tympanic membrane, external ear and ear canal normal.   Left Ear: Tympanic membrane, external ear and ear canal normal.   Nose: Congestion present.   Mouth/Throat: Uvula is midline, oropharynx is clear and moist and mucous membranes are normal. Mucous membranes are moist. Oropharynx is clear.   Eyes: Pupils are equal, round, and reactive to light.   Neck: Neck supple.   Cardiovascular: Normal rate, regular rhythm, normal heart sounds and normal pulses.   Pulmonary/Chest: Effort normal and breath sounds normal.   Abdominal: Normal appearance. Soft.   Musculoskeletal: Normal range of motion.         General: Normal range of motion.   Neurological: She is alert and oriented to person, place, and time.   Skin: Skin is warm and dry.   Vitals reviewed.      Results for orders placed or performed in visit on 03/10/23   POCT COVID-19 Rapid Screening   Result Value Ref Range    POC Rapid COVID Negative Negative     " Acceptable Yes    POCT Influenza A/B   Result Value Ref Range    Rapid Influenza A Ag Negative Negative    Rapid Influenza B Ag Negative Negative     Acceptable Yes    POCT Strep A, Molecular   Result Value Ref Range    Molecular Strep A, POC Negative Negative     Acceptable Yes       Assessment:       1. Allergic rhinitis, unspecified seasonality, unspecified trigger    2. Encounter for screening for COVID-19    3. Sore throat    4. Flu-like symptoms            Plan:         Allergic rhinitis, unspecified seasonality, unspecified trigger  -     cetirizine (ZYRTEC) 10 MG tablet; Take 1 tablet (10 mg total) by mouth once daily.  Dispense: 30 tablet; Refill: 11  -     dexAMETHasone injection 8 mg    Encounter for screening for COVID-19  -     POCT COVID-19 Rapid Screening    Sore throat  -     POCT COVID-19 Rapid Screening  -     POCT Influenza A/B  -     POCT Strep A, Molecular    Flu-like symptoms  -     POCT COVID-19 Rapid Screening  -     POCT Influenza A/B  -     POCT Strep A, Molecular      Please drink plenty of fluids.  Please get plenty of rest.    Take over the counter Tylenol (Acetaminophen) and/or Motrin (Ibuprofen) as directed for control of pain and/or fever.      ER precautions given, patient verbalized understanding.     Please see provided patient education for guidance.    Follow up with PCP or return to clinic if symptoms worsen or do not improve.

## 2023-03-10 NOTE — LETTER
March 10, 2023      [facility]  [facility] [address]  Phone: [phone]       Patient: Izabel Maldonado   YOB: 1995  Date of Visit: 03/10/2023    To Whom It May Concern:    Ana Maldonado  was at [facility] on 03/10/2023. The patient may return to work/school on MAR 13 2023 with no restrictions. If you have any questions or concerns, or if I can be of further assistance, please do not hesitate to contact me.    Sincerely,    EDITH PUGH NP

## 2023-07-11 ENCOUNTER — PATIENT MESSAGE (OUTPATIENT)
Dept: RESEARCH | Facility: HOSPITAL | Age: 28
End: 2023-07-11
Payer: COMMERCIAL

## 2023-12-26 ENCOUNTER — OFFICE VISIT (OUTPATIENT)
Dept: URGENT CARE | Facility: CLINIC | Age: 28
End: 2023-12-26
Payer: COMMERCIAL

## 2023-12-26 VITALS
TEMPERATURE: 99 F | HEART RATE: 79 BPM | SYSTOLIC BLOOD PRESSURE: 112 MMHG | WEIGHT: 222.88 LBS | RESPIRATION RATE: 18 BRPM | OXYGEN SATURATION: 98 % | HEIGHT: 67 IN | BODY MASS INDEX: 34.98 KG/M2 | DIASTOLIC BLOOD PRESSURE: 74 MMHG

## 2023-12-26 DIAGNOSIS — R05.9 COUGH, UNSPECIFIED TYPE: ICD-10-CM

## 2023-12-26 DIAGNOSIS — R09.81 SINUS CONGESTION: ICD-10-CM

## 2023-12-26 DIAGNOSIS — J11.1 INFLUENZA: Primary | ICD-10-CM

## 2023-12-26 LAB
CTP QC/QA: YES
CTP QC/QA: YES
POC MOLECULAR INFLUENZA A AGN: POSITIVE
POC MOLECULAR INFLUENZA B AGN: NEGATIVE
SARS-COV-2 RDRP RESP QL NAA+PROBE: NEGATIVE

## 2023-12-26 PROCEDURE — 99213 PR OFFICE/OUTPT VISIT, EST, LEVL III, 20-29 MIN: ICD-10-PCS | Mod: S$PBB,,, | Performed by: FAMILY MEDICINE

## 2023-12-26 PROCEDURE — 99215 OFFICE O/P EST HI 40 MIN: CPT | Mod: PBBFAC | Performed by: FAMILY MEDICINE

## 2023-12-26 PROCEDURE — 87635 SARS-COV-2 COVID-19 AMP PRB: CPT | Mod: PBBFAC | Performed by: FAMILY MEDICINE

## 2023-12-26 PROCEDURE — 99213 OFFICE O/P EST LOW 20 MIN: CPT | Mod: S$PBB,,, | Performed by: FAMILY MEDICINE

## 2023-12-26 PROCEDURE — 87502 INFLUENZA DNA AMP PROBE: CPT | Mod: PBBFAC | Performed by: FAMILY MEDICINE

## 2023-12-26 RX ORDER — OSELTAMIVIR PHOSPHATE 75 MG/1
75 CAPSULE ORAL 2 TIMES DAILY
Qty: 10 CAPSULE | Refills: 0 | Status: SHIPPED | OUTPATIENT
Start: 2023-12-26 | End: 2023-12-31

## 2023-12-26 NOTE — PROGRESS NOTES
"Subjective:       Patient ID: Izabel Maldonado is a 28 y.o. female.    Vitals:  height is 5' 7" (1.702 m) and weight is 101.1 kg (222 lb 14.2 oz). Her temperature is 98.6 °F (37 °C). Her blood pressure is 112/74 and her pulse is 79. Her respiration is 18 and oxygen saturation is 98%.     Chief Complaint: Cough and Sinus Problem (X 1day)    Cough  This is a new problem. The current episode started yesterday. The cough is Non-productive. Associated symptoms include chills, a fever, myalgias, nasal congestion and rhinorrhea. Pertinent negatives include no chest pain, hemoptysis, rash, sore throat, shortness of breath or wheezing.         Constitution: Positive for chills and fever.   HENT:  Negative for sore throat.    Cardiovascular:  Negative for chest pain.   Respiratory:  Positive for cough. Negative for bloody sputum, shortness of breath and wheezing.    Musculoskeletal:  Positive for muscle ache.   Skin:  Negative for rash.       Objective:   Physical Exam   Constitutional: She appears well-developed.  Non-toxic appearance. She does not appear ill. No distress.   HENT:   Head: Atraumatic.   Nose: No purulent discharge. Right sinus exhibits no maxillary sinus tenderness and no frontal sinus tenderness. Left sinus exhibits no maxillary sinus tenderness and no frontal sinus tenderness.   Mouth/Throat: Uvula is midline.   Eyes: Right eye exhibits no discharge. Left eye exhibits no discharge. Extraocular movement intact   Neck: Neck supple. No neck rigidity present.   Cardiovascular: Regular rhythm.   Pulmonary/Chest: Effort normal and breath sounds normal. No respiratory distress. She has no wheezes. She has no rales.   Lymphadenopathy:     She has no cervical adenopathy.   Neurological: She is alert.   Skin: Skin is warm, dry and not diaphoretic.   Psychiatric: Her behavior is normal.   Nursing note and vitals reviewed.      Results for orders placed or performed in visit on 12/26/23   POCT COVID-19 Rapid Screening "   Result Value Ref Range    POC Rapid COVID Negative Negative     Acceptable Yes    POCT Influenza A/B Molecular   Result Value Ref Range    POC Molecular Influenza A Ag Positive (A) Negative, Not Reported    POC Molecular Influenza B Ag Negative Negative, Not Reported     Acceptable Yes        Assessment:     1. Influenza    2. Cough, unspecified type    3. Sinus congestion          Plan:   Will give a course of Tamiflu.  Encouraged fluids.  Contagious precautions.  Work excuse    Influenza  -     oseltamivir (TAMIFLU) 75 MG capsule; Take 1 capsule (75 mg total) by mouth 2 (two) times daily. for 5 days  Dispense: 10 capsule; Refill: 0    Cough, unspecified type  -     POCT COVID-19 Rapid Screening  -     POCT Influenza A/B Molecular    Sinus congestion  -     POCT COVID-19 Rapid Screening  -     POCT Influenza A/B Molecular        Please note: This chart was completed via voice to text dictation. It may contain typographical/word recognition errors. If there are any questions, please contact the provider for final clarification.

## 2024-02-12 ENCOUNTER — OFFICE VISIT (OUTPATIENT)
Dept: URGENT CARE | Facility: CLINIC | Age: 29
End: 2024-02-12
Payer: COMMERCIAL

## 2024-02-12 VITALS
HEIGHT: 67 IN | RESPIRATION RATE: 18 BRPM | TEMPERATURE: 98 F | SYSTOLIC BLOOD PRESSURE: 129 MMHG | OXYGEN SATURATION: 100 % | DIASTOLIC BLOOD PRESSURE: 84 MMHG | HEART RATE: 77 BPM | BODY MASS INDEX: 35.16 KG/M2 | WEIGHT: 224 LBS

## 2024-02-12 DIAGNOSIS — U07.1 COVID: Primary | ICD-10-CM

## 2024-02-12 DIAGNOSIS — R68.89 FLU-LIKE SYMPTOMS: ICD-10-CM

## 2024-02-12 DIAGNOSIS — J02.0 STREP PHARYNGITIS: ICD-10-CM

## 2024-02-12 LAB
CTP QC/QA: YES
MOLECULAR STREP A: POSITIVE
POC MOLECULAR INFLUENZA A AGN: NEGATIVE
POC MOLECULAR INFLUENZA B AGN: NEGATIVE
SARS-COV-2 RDRP RESP QL NAA+PROBE: POSITIVE

## 2024-02-12 PROCEDURE — 99214 OFFICE O/P EST MOD 30 MIN: CPT | Mod: S$PBB,,, | Performed by: NURSE PRACTITIONER

## 2024-02-12 PROCEDURE — 87635 SARS-COV-2 COVID-19 AMP PRB: CPT | Mod: PBBFAC | Performed by: NURSE PRACTITIONER

## 2024-02-12 PROCEDURE — 87651 STREP A DNA AMP PROBE: CPT | Mod: PBBFAC | Performed by: NURSE PRACTITIONER

## 2024-02-12 PROCEDURE — 99215 OFFICE O/P EST HI 40 MIN: CPT | Mod: PBBFAC | Performed by: NURSE PRACTITIONER

## 2024-02-12 PROCEDURE — 87502 INFLUENZA DNA AMP PROBE: CPT | Mod: PBBFAC | Performed by: NURSE PRACTITIONER

## 2024-02-12 RX ORDER — FLUTICASONE PROPIONATE 50 MCG
2 SPRAY, SUSPENSION (ML) NASAL DAILY
Qty: 18.2 ML | Refills: 0 | Status: SHIPPED | OUTPATIENT
Start: 2024-02-12

## 2024-02-12 RX ORDER — PROMETHAZINE HYDROCHLORIDE AND DEXTROMETHORPHAN HYDROBROMIDE 6.25; 15 MG/5ML; MG/5ML
10 SYRUP ORAL
Qty: 120 ML | Refills: 0 | Status: SHIPPED | OUTPATIENT
Start: 2024-02-12

## 2024-02-12 RX ORDER — AMOXICILLIN 875 MG/1
875 TABLET, FILM COATED ORAL EVERY 12 HOURS
Qty: 20 TABLET | Refills: 0 | Status: SHIPPED | OUTPATIENT
Start: 2024-02-12 | End: 2024-02-22

## 2024-02-12 RX ORDER — ALBUTEROL SULFATE 90 UG/1
2 AEROSOL, METERED RESPIRATORY (INHALATION) EVERY 6 HOURS PRN
Qty: 1 G | Refills: 0 | Status: SHIPPED | OUTPATIENT
Start: 2024-02-12

## 2024-02-12 NOTE — LETTER
February 12, 2024      [facility]  [address] [place] [address]  Phone: [phone]       Patient: Izabel Maldonado   YOB: 1995  Date of Visit: 02/12/2024    To Whom It May Concern:    Ana Maldonado  was at [facility] on 02/12/2024. The patient may return to work/school on 2/19/24 with no restrictions. If you have any questions or concerns, or if I can be of further assistance, please do not hesitate to contact me.    Sincerely,    MELVA Whaley

## 2024-02-13 NOTE — PATIENT INSTRUCTIONS
Please follow instructions on patient education material.      Return to urgent care in 2 to 3 days if symptoms are not improving, immediately if you develop any new or worsening symptoms.     - OTC cold/flu products as desired for symptoms  - Plenty of fluids  - Humidified air  - Tylenol or Motrin for pain/fever  - Start antibiotics today.  - Easy to swallow foods such as applesauce, smoothies, protein shakes, grits, oatmeal.  - Alternate OTC pain/fever reducers every 4 hours today and tomorrow.  - Out of school and/or work until you have taken 24 hours of antibiotics and are fever free for 24 hours.  - New tooth brush on Day of Week: Wednesday.  - Strep IS CONTAGIOUS and is spread by spit. Do not share food, drinks, utensils, cosmetics, or saliva in any way until you are done with antibiotics.     I recommend a 7 day quarantine from day of symptom onset.   COVID is contagious for an average of EIGHT DAYS, starting from Day 1 that you have symptoms.  You can spread COVID 2-3 days before you have symptoms.  [facility] permits 5 days of quarantine. If you choose to quarantine for 5 days, wear a hospital-grade, surgical mask over your nose and mouth when around other people and in public through day 8.  A cloth mask provides no protection from spreading or dae COVID.    Go to the ER if you experience chest pain with shortness of breath, shortness of breath when moving around your house, high fevers 103.0+, excessive vomiting/diarrhea, or general distress.

## 2024-02-13 NOTE — PROGRESS NOTES
"Subjective:      Patient ID: Izabel Maldonado is a 28 y.o. female.    Vitals:  height is 5' 7" (1.702 m) and weight is 101.6 kg (224 lb). Her oral temperature is 98.3 °F (36.8 °C). Her blood pressure is 129/84 and her pulse is 77. Her respiration is 18 and oxygen saturation is 100%.     Chief Complaint: URI (Patient reports cough, sore throat, green mucus, HA x this am)    URI       patient presents with complaint of  productive cough  for green mucus, sore throat,  in headache that sudden the onset this morning.  Patient denies any chest pain, shortness for breath, dizziness, weakness, fever, stomach pain,  nausea or vomiting.  ROS   Objective:     Physical Exam   Constitutional: She is oriented to person, place, and time. She appears well-developed.  Non-toxic appearance. She does not appear ill. No distress.   HENT:   Head: Normocephalic and atraumatic.   Ears:   Right Ear: Tympanic membrane normal.   Left Ear: Tympanic membrane normal.   Nose: Rhinorrhea and congestion present. No purulent discharge. Right sinus exhibits no maxillary sinus tenderness and no frontal sinus tenderness. Left sinus exhibits no maxillary sinus tenderness and no frontal sinus tenderness.   Mouth/Throat: Uvula is midline. Mucous membranes are moist. No oropharyngeal exudate or posterior oropharyngeal erythema.   Eyes: Conjunctivae are normal. Right eye exhibits no discharge. Left eye exhibits no discharge. Extraocular movement intact   Neck: Neck supple. No neck rigidity present.   Cardiovascular: Normal rate and regular rhythm.   Pulmonary/Chest: Effort normal and breath sounds normal. No stridor. No respiratory distress. She has no wheezes. She has no rhonchi. She has no rales. She exhibits no tenderness.   Abdominal: Normal appearance and bowel sounds are normal. She exhibits no distension. Soft. There is no abdominal tenderness.   Musculoskeletal: Normal range of motion.         General: No swelling, tenderness, deformity or signs of " injury. Normal range of motion.      Right lower leg: No edema.      Left lower leg: No edema.   Lymphadenopathy:     She has no cervical adenopathy.   Neurological: She is alert and oriented to person, place, and time.   Skin: Skin is warm, dry and not diaphoretic. Capillary refill takes less than 2 seconds.   Psychiatric: Her behavior is normal. Mood, judgment and thought content normal.   Nursing note and vitals reviewed.      Assessment:     1. COVID    2. Strep pharyngitis    3. Flu-like symptoms      Results for orders placed or performed in visit on 02/12/24   POCT COVID-19 Rapid Screening   Result Value Ref Range    POC Rapid COVID Positive (A) Negative     Acceptable Yes    POCT Influenza A/B MOLECULAR   Result Value Ref Range    POC Molecular Influenza A Ag Negative Negative, Not Reported    POC Molecular Influenza B Ag Negative Negative, Not Reported     Acceptable Yes    POCT Strep A, Molecular   Result Value Ref Range    Molecular Strep A, POC Positive (A) Negative     Acceptable Yes          Plan:     ER precautions given and discussed..    Patient refused antiviral treatment with Paxlovid.  Patient opted for symptomatic treatment with medications as prescribed.  - OTC cold/flu products as desired for symptoms  - Plenty of fluids  - Humidified air  - Tylenol or Motrin for pain/fever  - Start antibiotics today.  - Easy to swallow foods such as applesauce, smoothies, protein shakes, grits, oatmeal.  - Alternate OTC pain/fever reducers every 4 hours today and tomorrow.  - Out of school and/or work until you have taken 24 hours of antibiotics and are fever free for 24 hours.  - New tooth brush on Day of Week: Wednesday.  - Strep IS CONTAGIOUS and is spread by spit. Do not share food, drinks, utensils, cosmetics, or saliva in any way until you are done with antibiotics.     COVID  -     promethazine-dextromethorphan (PROMETHAZINE-DM) 6.25-15 mg/5 mL Syrp; Take 10  mLs by mouth every 6 to 8 hours as needed (cough). May cause sedation.  Do not drive or operate heavy machinery after taking this medication.  Dispense: 120 mL; Refill: 0  -     albuterol (PROVENTIL HFA) 90 mcg/actuation inhaler; Inhale 2 puffs into the lungs every 6 (six) hours as needed for Shortness of Breath. Rescue  Dispense: 1 g; Refill: 0    Strep pharyngitis  -     amoxicillin (AMOXIL) 875 MG tablet; Take 1 tablet (875 mg total) by mouth every 12 (twelve) hours. for 10 days  Dispense: 20 tablet; Refill: 0    Flu-like symptoms  -     POCT COVID-19 Rapid Screening  -     POCT Influenza A/B MOLECULAR  -     POCT Strep A, Molecular    Other orders  -     fluticasone propionate (FLONASE) 50 mcg/actuation nasal spray; 2 sprays (100 mcg total) by Each Nostril route once daily.  Dispense: 18.2 mL; Refill: 0

## 2024-02-14 ENCOUNTER — PATIENT MESSAGE (OUTPATIENT)
Dept: RESEARCH | Facility: HOSPITAL | Age: 29
End: 2024-02-14
Payer: COMMERCIAL

## 2024-10-13 ENCOUNTER — OFFICE VISIT (OUTPATIENT)
Dept: URGENT CARE | Facility: CLINIC | Age: 29
End: 2024-10-13
Payer: COMMERCIAL

## 2024-10-13 VITALS
RESPIRATION RATE: 18 BRPM | HEIGHT: 68 IN | DIASTOLIC BLOOD PRESSURE: 81 MMHG | OXYGEN SATURATION: 98 % | SYSTOLIC BLOOD PRESSURE: 117 MMHG | BODY MASS INDEX: 32.28 KG/M2 | WEIGHT: 213 LBS | HEART RATE: 86 BPM | TEMPERATURE: 98 F

## 2024-10-13 DIAGNOSIS — J30.9 ALLERGIC RHINITIS, UNSPECIFIED SEASONALITY, UNSPECIFIED TRIGGER: Primary | ICD-10-CM

## 2024-10-13 DIAGNOSIS — R09.89 SYMPTOMS OF UPPER RESPIRATORY INFECTION (URI): ICD-10-CM

## 2024-10-13 LAB
CTP QC/QA: YES
CTP QC/QA: YES
MOLECULAR STREP A: NEGATIVE
SARS-COV-2 AG RESP QL IA.RAPID: NEGATIVE

## 2024-10-13 PROCEDURE — 87651 STREP A DNA AMP PROBE: CPT | Mod: PBBFAC | Performed by: NURSE PRACTITIONER

## 2024-10-13 PROCEDURE — 99213 OFFICE O/P EST LOW 20 MIN: CPT | Mod: S$PBB,,, | Performed by: NURSE PRACTITIONER

## 2024-10-13 PROCEDURE — 99215 OFFICE O/P EST HI 40 MIN: CPT | Mod: PBBFAC | Performed by: NURSE PRACTITIONER

## 2024-10-13 PROCEDURE — 87811 SARS-COV-2 COVID19 W/OPTIC: CPT | Mod: PBBFAC | Performed by: NURSE PRACTITIONER

## 2024-10-13 RX ORDER — LORATADINE 10 MG/1
10 TABLET ORAL DAILY
Qty: 30 TABLET | Refills: 0 | Status: SHIPPED | OUTPATIENT
Start: 2024-10-13 | End: 2024-11-12

## 2024-10-13 NOTE — LETTER
October 13, 2024      [facility]  [address] [place] [id number]  Phone: [phone]       Patient: Izabel Maldonado   YOB: 1995  Date of Visit: 10/13/2024    To Whom It May Concern:    Ana Maldonado  was at [facility] on 10/13/2024. The patient may return to work/school on 10/14/2024 with no restrictions. If you have any questions or concerns, or if I can be of further assistance, please do not hesitate to contact me.    Sincerely,    MELVA Whaley

## 2024-10-13 NOTE — PATIENT INSTRUCTIONS
Please follow instructions on patient education material.      Return to urgent care in 2 to 3 days if symptoms are not improving, immediately if you develop any new or worsening symptoms.   -Encouraged antihistamine daily for symptoms  -Try robitussin OTC as discussed for cough   -warm saltwater gargles to your throat  - OTC cold/flu products as desired for symptoms  - Plenty of fluids  - Humidified air  - Tylenol or Motrin for pain/fever    Go to the ER if you experience chest pain with shortness of breath, shortness of breath when moving around your house, high fevers 103.0+, excessive vomiting/diarrhea, or general distress.

## 2024-10-13 NOTE — PROGRESS NOTES
"Subjective:      Patient ID: Izabel Maldonado is a 29 y.o. female.    Vitals:  height is 5' 7.5" (1.715 m) and weight is 96.6 kg (213 lb). Her oral temperature is 97.9 °F (36.6 °C). Her blood pressure is 117/81 and her pulse is 86. Her respiration is 18 and oxygen saturation is 98%.     Chief Complaint: URI (Patient reports sore throat, runny nose, HA x 1 day )    As stated in CC. Pt denies fever, shortness for breath or chest pain, dizziness, weakness, stomach pain, nausea or vomiting.    URI   This is a new problem. The current episode started yesterday. The problem has been unchanged. There has been no fever. The cough is Non-productive (intermittent, every now and then i will cough). Associated symptoms include congestion, headaches and a sore throat. Treatments tried: OTC " congestion plus pill" The treatment provided mild relief.       HENT:  Positive for congestion and sore throat.    Skin:  Negative for erythema.   Neurological:  Positive for headaches.      Objective:     Physical Exam   Constitutional: She is oriented to person, place, and time. She appears well-developed. She is cooperative.  Non-toxic appearance. She does not appear ill. No distress. awake  HENT:   Head: Normocephalic and atraumatic.   Ears:   Right Ear: Hearing and tympanic membrane normal. No tenderness. Tympanic membrane is not injected, not erythematous and not bulging. No middle ear effusion.   Left Ear: Hearing and tympanic membrane normal. No tenderness. Tympanic membrane is not injected, not erythematous and not bulging.  No middle ear effusion.   Nose: Rhinorrhea and congestion present. No purulent discharge. Right sinus exhibits no maxillary sinus tenderness and no frontal sinus tenderness. Left sinus exhibits no maxillary sinus tenderness and no frontal sinus tenderness.   Mouth/Throat: Uvula is midline. Mucous membranes are moist. Cobblestoning present. No oropharyngeal exudate or posterior oropharyngeal erythema. Tonsils are 0 " on the right. Tonsils are 0 on the left. No tonsillar exudate.   Eyes: Conjunctivae are normal. Right eye exhibits no discharge. Left eye exhibits no discharge. Extraocular movement intact   Neck: Neck supple. No neck rigidity present.   Cardiovascular: Regular rhythm.   Pulmonary/Chest: Effort normal and breath sounds normal. No stridor. No respiratory distress. She has no wheezes. She has no rhonchi. She has no rales. She exhibits no tenderness.   Abdominal: She exhibits no distension. Soft. There is no abdominal tenderness.   Musculoskeletal: Normal range of motion.         General: Normal range of motion.      Cervical back: She exhibits no tenderness.   Lymphadenopathy:     She has no cervical adenopathy.   Neurological: no focal deficit. She is alert and oriented to person, place, and time.   Skin: Skin is warm, dry, not diaphoretic and no rash. Capillary refill takes less than 2 seconds. No erythema   Psychiatric: Her behavior is normal. Mood, judgment and thought content normal.   Nursing note and vitals reviewed.      Assessment:     1. Allergic rhinitis, unspecified seasonality, unspecified trigger    2. Symptoms of upper respiratory infection (URI)      Results for orders placed or performed in visit on 10/13/24   POCT Strep A, Molecular    Collection Time: 10/13/24 11:56 AM   Result Value Ref Range    Molecular Strep A, POC Negative Negative     Acceptable Yes    SARS Coronavirus 2 Antigen, POCT Manual Read    Collection Time: 10/13/24 11:57 AM   Result Value Ref Range    SARS Coronavirus 2 Antigen Negative Negative     Acceptable Yes          Plan:   ER precautions given and discussed.  COVID test is negative today in clinic.  Return to clinic or follow up with PCP  if symptoms persist greater than 7 days.  Also discussed with patient, symptoms may be related to URI.  Encouraged to remain hydrated, avoid any known allergy triggers or irritants, may take Tylenol or Motrin as  needed for discomfort if not contraindicated.    Encouraged antihistamine daily for symptoms  Try robitussin OTC as discussed  -warm saltwater gargles to your throat  - OTC cold/flu products as desired for symptoms  - Plenty of fluids  - Humidified air  Allergic rhinitis, unspecified seasonality, unspecified trigger  -     loratadine (CLARITIN) 10 mg tablet; Take 1 tablet (10 mg total) by mouth once daily.  Dispense: 30 tablet; Refill: 0    Symptoms of upper respiratory infection (URI)  -     POCT Strep A, Molecular  -     SARS Coronavirus 2 Antigen, POCT Manual Read

## 2024-11-21 ENCOUNTER — OFFICE VISIT (OUTPATIENT)
Dept: URGENT CARE | Facility: CLINIC | Age: 29
End: 2024-11-21
Payer: COMMERCIAL

## 2024-11-21 VITALS
WEIGHT: 212.19 LBS | HEIGHT: 68 IN | TEMPERATURE: 98 F | OXYGEN SATURATION: 100 % | SYSTOLIC BLOOD PRESSURE: 138 MMHG | BODY MASS INDEX: 32.16 KG/M2 | DIASTOLIC BLOOD PRESSURE: 83 MMHG | HEART RATE: 83 BPM | RESPIRATION RATE: 18 BRPM

## 2024-11-21 DIAGNOSIS — N89.8 VAGINAL DISCHARGE: Primary | ICD-10-CM

## 2024-11-21 LAB
CLUE CELLS VAG QL WET PREP: NORMAL
T VAGINALIS VAG QL WET PREP: NORMAL
WBC #/AREA VAG WET PREP: NORMAL
YEAST SPEC QL WET PREP: NORMAL

## 2024-11-21 PROCEDURE — 87210 SMEAR WET MOUNT SALINE/INK: CPT | Performed by: FAMILY MEDICINE

## 2024-11-21 PROCEDURE — 87591 N.GONORRHOEAE DNA AMP PROB: CPT | Performed by: FAMILY MEDICINE

## 2024-11-21 PROCEDURE — 99213 OFFICE O/P EST LOW 20 MIN: CPT | Mod: S$PBB,,, | Performed by: FAMILY MEDICINE

## 2024-11-21 PROCEDURE — 99214 OFFICE O/P EST MOD 30 MIN: CPT | Mod: PBBFAC | Performed by: FAMILY MEDICINE

## 2024-11-21 NOTE — PROGRESS NOTES
"Subjective:       Patient ID: Izabel Maldonado is a 29 y.o. female.    Vitals:  height is 5' 7.5" (1.715 m) and weight is 96.3 kg (212 lb 3.2 oz). Her oral temperature is 98.3 °F (36.8 °C). Her blood pressure is 138/83 and her pulse is 83. Her respiration is 18 and oxygen saturation is 100%.     Chief Complaint: Vaginal Itching (Patient states vaginal itching, discharge x 2 days. Used boric acid with mild relief. )    Patient with 2 days of vaginal pruritus, possible discharge.  No abdominal or pelvic pain.  No back or flank pain.  No missed menses.  Sexually active, not necessarily concerned about STI.    All other systems are negative    Chart reviewed    Objective:   Physical Exam   Constitutional:  Non-toxic appearance. She does not appear ill. No distress.   Neck: Neck supple.   Abdominal: Normal appearance. She exhibits no distension. flat abdomen There is no abdominal tenderness. There is no rebound, no guarding, no left CVA tenderness and no right CVA tenderness.   Neurological: no focal deficit. She is alert.   Skin: Skin is warm, dry and not diaphoretic.   Psychiatric: Her behavior is normal. Mood normal.   Nursing note and vitals reviewed.        Assessment:     1. Vaginal discharge            Plan:   Will notify patient of any positive results on testing and treat accordingly.  Patient can follow all test results on the patient portal.     Please practice safe sex and read patient education material.  Avoid sexual activity until all results are known. Follow up with PCP or return to urgent care if symptoms are not improving in several days. Go to the ER if symptoms worsen or new symptoms develop.      Vaginal discharge  -     Wet Prep, Genital  -     Sexually-Transmitted Infections (STIs) Increased Risk Panel        Please note: This chart was completed via voice to text dictation. It may contain typographical/word recognition errors. If there are any questions, please contact the provider for final " clarification.

## 2024-11-22 LAB
C TRACH RRNA UR QL NAA+PROBE: NOT DETECTED
N GONORRHOEA DNA UR QL NAA+PROBE: NOT DETECTED
T VAGINALIS RRNA UR QL NAA+PROBE: NOT DETECTED